# Patient Record
Sex: MALE
[De-identification: names, ages, dates, MRNs, and addresses within clinical notes are randomized per-mention and may not be internally consistent; named-entity substitution may affect disease eponyms.]

---

## 2018-02-23 ENCOUNTER — RESULT REVIEW (OUTPATIENT)
Age: 48
End: 2018-02-23

## 2018-02-23 ENCOUNTER — RX RENEWAL (OUTPATIENT)
Age: 48
End: 2018-02-23

## 2018-02-24 ENCOUNTER — APPOINTMENT (OUTPATIENT)
Dept: FAMILY MEDICINE | Facility: CLINIC | Age: 48
End: 2018-02-24
Payer: COMMERCIAL

## 2018-02-24 VITALS
BODY MASS INDEX: 27.5 KG/M2 | DIASTOLIC BLOOD PRESSURE: 100 MMHG | WEIGHT: 203 LBS | HEART RATE: 71 BPM | SYSTOLIC BLOOD PRESSURE: 150 MMHG | HEIGHT: 72 IN | OXYGEN SATURATION: 97 % | RESPIRATION RATE: 12 BRPM

## 2018-02-24 VITALS — DIASTOLIC BLOOD PRESSURE: 96 MMHG | SYSTOLIC BLOOD PRESSURE: 144 MMHG

## 2018-02-24 DIAGNOSIS — Z78.9 OTHER SPECIFIED HEALTH STATUS: ICD-10-CM

## 2018-02-24 PROCEDURE — 99213 OFFICE O/P EST LOW 20 MIN: CPT

## 2018-05-19 ENCOUNTER — APPOINTMENT (OUTPATIENT)
Dept: FAMILY MEDICINE | Facility: CLINIC | Age: 48
End: 2018-05-19

## 2018-11-01 ENCOUNTER — APPOINTMENT (OUTPATIENT)
Dept: FAMILY MEDICINE | Facility: CLINIC | Age: 48
End: 2018-11-01
Payer: COMMERCIAL

## 2018-11-01 VITALS
WEIGHT: 215 LBS | BODY MASS INDEX: 29.16 KG/M2 | HEART RATE: 89 BPM | OXYGEN SATURATION: 97 % | SYSTOLIC BLOOD PRESSURE: 116 MMHG | DIASTOLIC BLOOD PRESSURE: 90 MMHG | RESPIRATION RATE: 12 BRPM

## 2018-11-01 VITALS — DIASTOLIC BLOOD PRESSURE: 82 MMHG | SYSTOLIC BLOOD PRESSURE: 120 MMHG

## 2018-11-01 PROCEDURE — 99213 OFFICE O/P EST LOW 20 MIN: CPT

## 2018-11-01 RX ORDER — DOXYCYCLINE HYCLATE 100 MG/1
100 TABLET ORAL
Qty: 50 | Refills: 0 | Status: DISCONTINUED | COMMUNITY
Start: 2018-07-30 | End: 2018-11-01

## 2018-11-01 RX ORDER — METRONIDAZOLE 7.5 MG/G
0.75 CREAM TOPICAL
Qty: 45 | Refills: 0 | Status: DISCONTINUED | COMMUNITY
Start: 2018-07-30 | End: 2018-11-01

## 2018-11-01 NOTE — HISTORY OF PRESENT ILLNESS
[FreeTextEntry1] : Pt is here for a med check.  [de-identified] : Here for management of HTN - had a flu shot at work

## 2018-11-01 NOTE — PHYSICAL EXAM
[No Acute Distress] : no acute distress [Well Nourished] : well nourished [Well Developed] : well developed [Well-Appearing] : well-appearing [No JVD] : no jugular venous distention [Supple] : supple [No Lymphadenopathy] : no lymphadenopathy [No Respiratory Distress] : no respiratory distress  [Clear to Auscultation] : lungs were clear to auscultation bilaterally [Normal Rate] : normal rate  [Regular Rhythm] : with a regular rhythm [Normal S1, S2] : normal S1 and S2 [No Murmur] : no murmur heard [No Carotid Bruits] : no carotid bruits [Normal Posterior Cervical Nodes] : no posterior cervical lymphadenopathy [Normal Anterior Cervical Nodes] : no anterior cervical lymphadenopathy

## 2018-11-01 NOTE — ASSESSMENT
[FreeTextEntry1] : 1. HTN- BP was well controlled on norvasc 5 mg 1/day- diet/exercise reviewed.  Will be scheduling colonoscopy.

## 2018-12-20 ENCOUNTER — APPOINTMENT (OUTPATIENT)
Dept: FAMILY MEDICINE | Facility: CLINIC | Age: 48
End: 2018-12-20
Payer: COMMERCIAL

## 2018-12-20 VITALS
SYSTOLIC BLOOD PRESSURE: 126 MMHG | RESPIRATION RATE: 12 BRPM | OXYGEN SATURATION: 98 % | HEART RATE: 81 BPM | DIASTOLIC BLOOD PRESSURE: 70 MMHG | BODY MASS INDEX: 28.44 KG/M2 | WEIGHT: 210 LBS | HEIGHT: 72 IN

## 2018-12-20 PROCEDURE — 99213 OFFICE O/P EST LOW 20 MIN: CPT

## 2018-12-20 NOTE — ASSESSMENT
[FreeTextEntry1] : HTN- well controlled- 126/70- on norvasc 5 mg a day. Diet/exercise reviewed.  Hit 49 y/o - referral for colonoscopy made.  No refill needed.

## 2018-12-20 NOTE — HISTORY OF PRESENT ILLNESS
[FreeTextEntry1] : Patient present for blood pressure check [de-identified] : Here for management of HTN - feeling well on treatment.

## 2019-03-22 ENCOUNTER — RX RENEWAL (OUTPATIENT)
Age: 49
End: 2019-03-22

## 2019-04-24 ENCOUNTER — MEDICATION RENEWAL (OUTPATIENT)
Age: 49
End: 2019-04-24

## 2019-09-24 ENCOUNTER — APPOINTMENT (OUTPATIENT)
Dept: FAMILY MEDICINE | Facility: CLINIC | Age: 49
End: 2019-09-24
Payer: COMMERCIAL

## 2019-09-24 ENCOUNTER — NON-APPOINTMENT (OUTPATIENT)
Age: 49
End: 2019-09-24

## 2019-09-24 VITALS
DIASTOLIC BLOOD PRESSURE: 80 MMHG | OXYGEN SATURATION: 98 % | HEIGHT: 72 IN | SYSTOLIC BLOOD PRESSURE: 130 MMHG | WEIGHT: 208 LBS | RESPIRATION RATE: 14 BRPM | HEART RATE: 87 BPM | BODY MASS INDEX: 28.17 KG/M2

## 2019-09-24 DIAGNOSIS — Z00.00 ENCOUNTER FOR GENERAL ADULT MEDICAL EXAMINATION W/OUT ABNORMAL FINDINGS: ICD-10-CM

## 2019-09-24 DIAGNOSIS — M54.5 LOW BACK PAIN: ICD-10-CM

## 2019-09-24 LAB
BILIRUB UR QL STRIP: NEGATIVE
GLUCOSE UR-MCNC: NEGATIVE
HCG UR QL: 0.2 EU/DL
HGB UR QL STRIP.AUTO: NEGATIVE
KETONES UR-MCNC: NEGATIVE
LEUKOCYTE ESTERASE UR QL STRIP: NEGATIVE
NITRITE UR QL STRIP: NEGATIVE
PH UR STRIP: 5.5
PROT UR STRIP-MCNC: NEGATIVE
SP GR UR STRIP: 1.02

## 2019-09-24 PROCEDURE — 93000 ELECTROCARDIOGRAM COMPLETE: CPT

## 2019-09-24 PROCEDURE — 81003 URINALYSIS AUTO W/O SCOPE: CPT | Mod: QW

## 2019-09-24 PROCEDURE — 99396 PREV VISIT EST AGE 40-64: CPT | Mod: 25

## 2019-09-24 PROCEDURE — 90674 CCIIV4 VAC NO PRSV 0.5 ML IM: CPT

## 2019-09-24 PROCEDURE — G0008: CPT

## 2019-09-24 NOTE — HEALTH RISK ASSESSMENT
[Very Good] : ~his/her~  mood as very good [Monthly or less (1 pt)] : Monthly or less (1 point) [Yes] : Yes [1 or 2 (0 pts)] : 1 or 2 (0 points) [Never (0 pts)] : Never (0 points) [No] : In the past 12 months have you used drugs other than those required for medical reasons? No [No falls in past year] : Patient reported no falls in the past year [0] : 2) Feeling down, depressed, or hopeless: Not at all (0) [None] : None [With Significant Other] : lives with significant other [With Family] : lives with family [Employed] : employed [College] : College [] :  [Sexually Active] : sexually active [# Of Children ___] : has [unfilled] children [Feels Safe at Home] : Feels safe at home [Fully functional (bathing, dressing, toileting, transferring, walking, feeding)] : Fully functional (bathing, dressing, toileting, transferring, walking, feeding) [Fully functional (using the telephone, shopping, preparing meals, housekeeping, doing laundry, using] : Fully functional and needs no help or supervision to perform IADLs (using the telephone, shopping, preparing meals, housekeeping, doing laundry, using transportation, managing medications and managing finances) [Reports normal functional visual acuity (ie: able to read med bottle)] : Reports normal functional visual acuity [Smoke Detector] : smoke detector [Carbon Monoxide Detector] : carbon monoxide detector [Safety elements used in home] : safety elements used in home [Seat Belt] :  uses seat belt [Sunscreen] : uses sunscreen [Patient/Caregiver unclear of wishes] : Patient/Caregiver unclear of wishes [] : No [Audit-CScore] : 1 [de-identified] : regular exercise [de-identified] : no special diet [[id number]] : 0 [Change in mental status noted] : No change in mental status noted [Language] : denies difficulty with language [Behavior] : denies difficulty with behavior [Learning/Retaining New Information] : denies difficulty learning/retaining new information [Handling Complex Tasks] : denies difficulty handling complex tasks [Reasoning] : denies difficulty with reasoning [Spatial Ability and Orientation] : denies difficulty with spatial ability and orientation [Reports changes in hearing] : Reports no changes in hearing [Reports changes in vision] : Reports no changes in vision [Reports changes in dental health] : Reports no changes in dental health [Guns at Home] : no guns at home [Travel to Developing Areas] : does not  travel to developing areas [TB Exposure] : is not being exposed to tuberculosis [ColonoscopyDate] : not yet [HIVComments] : negative [HepatitisCComments] : negative [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

## 2019-09-24 NOTE — PHYSICAL EXAM
[No Carotid Bruits] : no carotid bruits [No Edema] : there was no peripheral edema [Soft] : abdomen soft [Normal Bowel Sounds] : normal bowel sounds [Non Tender] : non-tender [Normal Posterior Cervical Nodes] : no posterior cervical lymphadenopathy [Normal Anterior Cervical Nodes] : no anterior cervical lymphadenopathy [Normal] : affect was normal and insight and judgment were intact [de-identified] : mild tenderness in lumbar spine [de-identified] : no suspicious lesions noted on exam

## 2019-11-13 ENCOUNTER — RX RENEWAL (OUTPATIENT)
Age: 49
End: 2019-11-13

## 2019-12-10 ENCOUNTER — RX RENEWAL (OUTPATIENT)
Age: 49
End: 2019-12-10

## 2019-12-28 ENCOUNTER — RX RENEWAL (OUTPATIENT)
Age: 49
End: 2019-12-28

## 2020-01-16 ENCOUNTER — APPOINTMENT (OUTPATIENT)
Dept: FAMILY MEDICINE | Facility: CLINIC | Age: 50
End: 2020-01-16
Payer: COMMERCIAL

## 2020-01-16 VITALS
HEART RATE: 79 BPM | HEIGHT: 72 IN | OXYGEN SATURATION: 98 % | DIASTOLIC BLOOD PRESSURE: 72 MMHG | SYSTOLIC BLOOD PRESSURE: 126 MMHG | BODY MASS INDEX: 27.36 KG/M2 | RESPIRATION RATE: 14 BRPM | WEIGHT: 202 LBS

## 2020-01-16 DIAGNOSIS — Z12.11 ENCOUNTER FOR SCREENING FOR MALIGNANT NEOPLASM OF COLON: ICD-10-CM

## 2020-01-16 PROCEDURE — 99213 OFFICE O/P EST LOW 20 MIN: CPT

## 2020-01-16 NOTE — ASSESSMENT
[FreeTextEntry1] : Pt strongly advised to do colonoscopy - referral given, pt agreeable to complete\par Blood pressure, stable, continue current medication regimen\par DASH diet \par

## 2020-01-16 NOTE — HISTORY OF PRESENT ILLNESS
[FreeTextEntry1] : pt presents for blood pressure check  [de-identified] : Patient reports today fro follow up. Patient reports feeling well, no acute complaints.

## 2020-08-15 ENCOUNTER — APPOINTMENT (OUTPATIENT)
Dept: FAMILY MEDICINE | Facility: CLINIC | Age: 50
End: 2020-08-15
Payer: COMMERCIAL

## 2020-08-15 ENCOUNTER — APPOINTMENT (OUTPATIENT)
Dept: FAMILY MEDICINE | Facility: CLINIC | Age: 50
End: 2020-08-15

## 2020-08-15 VITALS
TEMPERATURE: 98 F | WEIGHT: 212 LBS | RESPIRATION RATE: 14 BRPM | HEART RATE: 81 BPM | OXYGEN SATURATION: 98 % | HEIGHT: 72 IN | DIASTOLIC BLOOD PRESSURE: 80 MMHG | BODY MASS INDEX: 28.71 KG/M2 | SYSTOLIC BLOOD PRESSURE: 120 MMHG

## 2020-08-15 VITALS — DIASTOLIC BLOOD PRESSURE: 90 MMHG | SYSTOLIC BLOOD PRESSURE: 140 MMHG

## 2020-08-15 DIAGNOSIS — R51 HEADACHE: ICD-10-CM

## 2020-08-15 DIAGNOSIS — R42 DIZZINESS AND GIDDINESS: ICD-10-CM

## 2020-08-15 PROCEDURE — 99214 OFFICE O/P EST MOD 30 MIN: CPT

## 2020-08-15 RX ORDER — IBUPROFEN 800 MG/1
800 TABLET, FILM COATED ORAL 3 TIMES DAILY
Qty: 90 | Refills: 1 | Status: DISCONTINUED | COMMUNITY
Start: 2019-09-24 | End: 2020-08-15

## 2020-08-15 NOTE — HISTORY OF PRESENT ILLNESS
[FreeTextEntry8] : pt presents for blood pressure check \par and c/o vertigo sx x 2 days \par he said he woke up in the morning and he felt the room spin\par he said if he gives himself time it usually resolves\par he had a headache in the back of his head at the time\par he said he feels 97% back to normal but not completely\par denies syncope\par \par he said he has had this in the past and he felt the same way\par \par he said it usually lasts 3 days to 7 days and then resolves\par \par he said if he is lying in bed and turns over quick he feels the room spinning \par denies dizziness with head movements\par \par c/o headache dull  pain,   1-2 /10 intermittent but resolved , alleviated by excedrin or tylenol  , aggravated by   vertigo as it comes on after vertigo occurs\par this was  a new headache and not like headaches he has had in the past \par \par he never saw an ent\par \par he can still do his normal activities\par denies fevers or chills, nausea or vomiting \par \par \par \par

## 2020-08-15 NOTE — REVIEW OF SYSTEMS
[Headache] : headache [Dizziness] : dizziness [Negative] : Respiratory [Fever] : no fever [Earache] : no earache [Vision Problems] : no vision problems [Chills] : no chills [Hearing Loss] : no hearing loss [Unsteady Walk] : no ataxia [Fainting] : no fainting [Confusion] : no confusion [FreeTextEntry3] : denies vision changes with headaches  [Memory Loss] : no memory loss

## 2020-08-15 NOTE — PHYSICAL EXAM
[No Lymphadenopathy] : no lymphadenopathy [Supple] : supple [Normal] : normal rate, regular rhythm, normal S1 and S2 and no murmur heard [No Edema] : there was no peripheral edema [No Focal Deficits] : no focal deficits [Normal Affect] : the affect was normal [Normal Mood] : the mood was normal [de-identified] : CN 2-12 INTACT, NORMAL STRENGTH UPPER AND LOWER EXT B/L 5/5, NORMAL RAPID ALTERNATING MOVEMENTS AND FINGER TO NOSE [Normal Insight/Judgement] : insight and judgment were intact [de-identified] : no nystagmus b/l eyes

## 2020-08-15 NOTE — PLAN
[FreeTextEntry1] : \par \par new headache and vertigo\par ent consult\par mri/mra if neg will send to neuro for further eval\par labs will go to [facility]\par meclizine trial 12.5mg tab 1-2 tabs bid prn advised no driving after use as might make him drowsy\par \par HTN slightly uncontrolled\par repeat 2 weeks\par avoid salt\par \par f/u 2 weeks for bp check pt agreed w/plan

## 2020-08-29 ENCOUNTER — APPOINTMENT (OUTPATIENT)
Dept: FAMILY MEDICINE | Facility: CLINIC | Age: 50
End: 2020-08-29

## 2020-09-03 ENCOUNTER — APPOINTMENT (OUTPATIENT)
Dept: FAMILY MEDICINE | Facility: CLINIC | Age: 50
End: 2020-09-03

## 2020-09-23 ENCOUNTER — RX RENEWAL (OUTPATIENT)
Age: 50
End: 2020-09-23

## 2020-11-23 ENCOUNTER — NON-APPOINTMENT (OUTPATIENT)
Age: 50
End: 2020-11-23

## 2021-06-21 ENCOUNTER — TRANSCRIPTION ENCOUNTER (OUTPATIENT)
Age: 51
End: 2021-06-21

## 2021-06-22 ENCOUNTER — TRANSCRIPTION ENCOUNTER (OUTPATIENT)
Age: 51
End: 2021-06-22

## 2021-09-11 ENCOUNTER — RX RENEWAL (OUTPATIENT)
Age: 51
End: 2021-09-11

## 2021-09-17 ENCOUNTER — APPOINTMENT (OUTPATIENT)
Dept: FAMILY MEDICINE | Facility: CLINIC | Age: 51
End: 2021-09-17
Payer: COMMERCIAL

## 2021-09-17 VITALS
SYSTOLIC BLOOD PRESSURE: 136 MMHG | OXYGEN SATURATION: 96 % | BODY MASS INDEX: 28.71 KG/M2 | DIASTOLIC BLOOD PRESSURE: 90 MMHG | WEIGHT: 212 LBS | TEMPERATURE: 97.8 F | HEIGHT: 72 IN | HEART RATE: 79 BPM | RESPIRATION RATE: 14 BRPM

## 2021-09-17 PROCEDURE — 99213 OFFICE O/P EST LOW 20 MIN: CPT

## 2021-09-17 RX ORDER — MECLIZINE HYDROCHLORIDE 12.5 MG/1
12.5 TABLET ORAL
Qty: 40 | Refills: 0 | Status: DISCONTINUED | COMMUNITY
Start: 2020-08-15 | End: 2021-09-17

## 2021-09-17 NOTE — PHYSICAL EXAM
[No Acute Distress] : no acute distress [Normal Sclera/Conjunctiva] : normal sclera/conjunctiva [No JVD] : no jugular venous distention [No Respiratory Distress] : no respiratory distress  [No Accessory Muscle Use] : no accessory muscle use [Clear to Auscultation] : lungs were clear to auscultation bilaterally [Normal Rate] : normal rate  [Regular Rhythm] : with a regular rhythm [Normal S1, S2] : normal S1 and S2 [No Murmur] : no murmur heard [No Extremity Clubbing/Cyanosis] : no extremity clubbing/cyanosis [Kyphosis] : no kyphosis [Normal Gait] : normal gait [Normal Affect] : the affect was normal [Normal Insight/Judgement] : insight and judgment were intact

## 2021-09-17 NOTE — HISTORY OF PRESENT ILLNESS
[FreeTextEntry1] : patient presents for medication renewal\par  [de-identified] : 51 yr old male is here today for follow up. States vertigo symptoms have resolved. Denies changes in vision, dizziness, chest pain, palpitations and lower extremity edema.\par

## 2021-09-17 NOTE — PLAN
[FreeTextEntry1] : 51 yr old male medication refills \par \par continue all medications as directed \par healthy diet and physical activity as tolerated\par BP goal <140/90 \par RTO as routine for follow up. \par

## 2021-11-15 ENCOUNTER — RESULT CHARGE (OUTPATIENT)
Age: 51
End: 2021-11-15

## 2021-11-16 ENCOUNTER — APPOINTMENT (OUTPATIENT)
Dept: FAMILY MEDICINE | Facility: CLINIC | Age: 51
End: 2021-11-16
Payer: COMMERCIAL

## 2021-11-16 ENCOUNTER — NON-APPOINTMENT (OUTPATIENT)
Age: 51
End: 2021-11-16

## 2021-11-16 VITALS
BODY MASS INDEX: 28.44 KG/M2 | SYSTOLIC BLOOD PRESSURE: 130 MMHG | WEIGHT: 210 LBS | DIASTOLIC BLOOD PRESSURE: 100 MMHG | TEMPERATURE: 97.6 F | OXYGEN SATURATION: 95 % | RESPIRATION RATE: 16 BRPM | HEIGHT: 72 IN | HEART RATE: 94 BPM

## 2021-11-16 VITALS — TEMPERATURE: 97.3 F

## 2021-11-16 DIAGNOSIS — N52.9 MALE ERECTILE DYSFUNCTION, UNSPECIFIED: ICD-10-CM

## 2021-11-16 DIAGNOSIS — L50.3 DERMATOGRAPHIC URTICARIA: ICD-10-CM

## 2021-11-16 DIAGNOSIS — Z23 ENCOUNTER FOR IMMUNIZATION: ICD-10-CM

## 2021-11-16 PROCEDURE — 99214 OFFICE O/P EST MOD 30 MIN: CPT | Mod: 25

## 2021-11-16 PROCEDURE — G0008: CPT

## 2021-11-16 PROCEDURE — 93000 ELECTROCARDIOGRAM COMPLETE: CPT

## 2021-11-16 PROCEDURE — 90686 IIV4 VACC NO PRSV 0.5 ML IM: CPT

## 2021-11-16 NOTE — PHYSICAL EXAM
[No Lymphadenopathy] : no lymphadenopathy [Supple] : supple [Normal] : normal rate, regular rhythm, normal S1 and S2 and no murmur heard [Pedal Pulses Present] : the pedal pulses are present [No Edema] : there was no peripheral edema [Coordination Grossly Intact] : coordination grossly intact [No Focal Deficits] : no focal deficits [Normal Gait] : normal gait [de-identified] : no calf tenderness bilaterally [de-identified] : +scratch test over back. multiple healing insect bites over left shoulder and left anterior chest.

## 2021-11-16 NOTE — REVIEW OF SYSTEMS
[Itching] : itching [Skin Rash] : skin rash [Impotence] : impotence [Negative] : Neurological [Dysuria] : no dysuria [Incontinence] : no incontinence [Hesitancy] : no hesitancy [Nocturia] : no nocturia [Hematuria] : no hematuria [Frequency] : no frequency [Poor Libido] : libido not poor [Mole Changes] : no mole changes [Nail Changes] : no nail changes [Hair Changes] : no hair changes

## 2021-11-16 NOTE — HISTORY OF PRESENT ILLNESS
[FreeTextEntry1] : Patient presents for a follow up on blood pressure [de-identified] : 50 yo male, pmh of HTN who has been on norvasc 5 for several years who presents for BP follow up. Also has other complaints today. \carlos Says he is concerned for occasional rash on the back. Gets very itchy. Tried lotion when he can reach. Started last year during winter and goes away during summer. He tends to scratch. No new soaps or detergents. No new fabrics. Had some recent flea bites as his dog recently had fleas. But itching was present prior to said bites.  \carlos Also requests refill of ED medications to new pharmacy. \carlos Denies any headaches. blurry vision, dyspnea, chest pain, cough, dizziness, numbness, weakness. \carlos

## 2021-11-16 NOTE — ASSESSMENT
[FreeTextEntry1] : HTN-uncontrolled\par Goal BP <140/90 or <130/80 if comorbidities \par pt is on norvasc 5, declines increase dose of medication. Says that he thinks it is due to anxiety/white coat when in office. \par Advised home BP monitoring and to keep log and advised to bring readings to office next visit. Also advised to bring machine to office to ensure machine calibrated and logs are accurate. If normal at home, may confirm white coat syndrome component. \par checking labs for renal function in chemistry and blood count in cbc \par EKG ordered today showing Sinus, rate 87. RSR v1. No evidence of acute ischemic changes. compared to 2019 and is improved as prior showed RBBB\par Avoid salt\par Cardio consult on monday with [facility]\par given precautions for uncontrolled BP including CVA. Advised to seek higher level of care if with chest pain, dizziness, headaches, blurry vision or generally feeling unwell. \par Patient verbalized understanding of this plan. \par \par Urticaria\par advised moisturizing skin \par avoid new soaps or detergents\par claritin sent for daily use to assess for improvement in histamine reaction\par \par Erectile Dysfunction\par well controlled on current treatment\par refills sent to new pharmacy as requested by patient \par \par Regarding patient's BMI currently in range of:overweight \par - encourage lifestyle modifications\par - diet and exercise\par - reduce calorie intake\par - no soda/ junk food/ snacks\par - consider mixed grains/ whole grains, leafy vegetables, and an appropriate serving of protein\par - lipid and a1c ordered\par \par \par Flu shot given\par F/u 1 month for BP check\par labs to be done at outside lab, will notify patient of results when available and received.\par Patient agrees with plan, all further questions answered during encounter.\par

## 2021-11-17 LAB — HBA1C MFR BLD HPLC: 5.3

## 2021-11-19 ENCOUNTER — APPOINTMENT (OUTPATIENT)
Dept: FAMILY MEDICINE | Facility: CLINIC | Age: 51
End: 2021-11-19

## 2021-12-14 ENCOUNTER — APPOINTMENT (OUTPATIENT)
Dept: CT IMAGING | Facility: CLINIC | Age: 51
End: 2021-12-14
Payer: COMMERCIAL

## 2021-12-14 ENCOUNTER — OUTPATIENT (OUTPATIENT)
Dept: OUTPATIENT SERVICES | Facility: HOSPITAL | Age: 51
LOS: 1 days | End: 2021-12-14
Payer: COMMERCIAL

## 2021-12-14 DIAGNOSIS — R06.09 OTHER FORMS OF DYSPNEA: ICD-10-CM

## 2021-12-14 PROCEDURE — 75574 CT ANGIO HRT W/3D IMAGE: CPT

## 2021-12-14 PROCEDURE — 75574 CT ANGIO HRT W/3D IMAGE: CPT | Mod: 26

## 2021-12-22 ENCOUNTER — APPOINTMENT (OUTPATIENT)
Dept: FAMILY MEDICINE | Facility: CLINIC | Age: 51
End: 2021-12-22
Payer: COMMERCIAL

## 2021-12-22 VITALS
RESPIRATION RATE: 14 BRPM | HEIGHT: 72 IN | WEIGHT: 210 LBS | BODY MASS INDEX: 28.44 KG/M2 | TEMPERATURE: 97.8 F | OXYGEN SATURATION: 97 % | HEART RATE: 83 BPM | DIASTOLIC BLOOD PRESSURE: 90 MMHG | SYSTOLIC BLOOD PRESSURE: 152 MMHG

## 2021-12-22 VITALS — SYSTOLIC BLOOD PRESSURE: 142 MMHG | DIASTOLIC BLOOD PRESSURE: 100 MMHG

## 2021-12-22 DIAGNOSIS — I10 ESSENTIAL (PRIMARY) HYPERTENSION: ICD-10-CM

## 2021-12-22 PROCEDURE — 99214 OFFICE O/P EST MOD 30 MIN: CPT

## 2021-12-22 NOTE — PLAN
[FreeTextEntry1] : HTN-uncontrolled\par Goal BP <140/90 \par increasing norvasc now to 10mg daily. \par counseled on side effects, call me if worsening leg swelling noted. \par nov 2021 labs for renal function, blood count, lipids all normal range. \par Avoid salt\par Says he recently saw [facility], had coronary cta done, was noted wnl per patient, will request records. \par Advised to purchase home BP monitor and to keep log and advised to bring readings to office next visit\par given precautions for uncontrolled BP including CVA. Advised to seek higher level of care if with chest pain, dizziness, headaches, blurry vision or generally feeling unwell. \par Patient verbalized understanding of this plan. \par \par f/u 1 month for bp\par Patient agrees with plan, all further questions answered during encounter.\par \par \par

## 2021-12-22 NOTE — HISTORY OF PRESENT ILLNESS
[FreeTextEntry1] : patient presents for blood pressure check  [de-identified] : 50 yo male, hx of htn, who presents today for follow up of htn. \par Has been monitoring BP at work, does not have bp cuff at home, runs 130s/90-100s at work. \par No headache, blurry vision. \par No chest pain or dyspnea. \par He has been having no other symptoms. \par No side effects to taking norvasc thus far. \par No other complaints today.

## 2021-12-22 NOTE — PHYSICAL EXAM
[No Lymphadenopathy] : no lymphadenopathy [Supple] : supple [Normal] : normal rate, regular rhythm, normal S1 and S2 and no murmur heard [No Edema] : there was no peripheral edema [Coordination Grossly Intact] : coordination grossly intact [No Focal Deficits] : no focal deficits [Normal Gait] : normal gait

## 2022-02-07 ENCOUNTER — RX RENEWAL (OUTPATIENT)
Age: 52
End: 2022-02-07

## 2022-06-06 ENCOUNTER — APPOINTMENT (OUTPATIENT)
Dept: FAMILY MEDICINE | Facility: CLINIC | Age: 52
End: 2022-06-06
Payer: COMMERCIAL

## 2022-06-06 VITALS
HEART RATE: 75 BPM | HEIGHT: 72 IN | RESPIRATION RATE: 15 BRPM | TEMPERATURE: 97.3 F | DIASTOLIC BLOOD PRESSURE: 90 MMHG | WEIGHT: 202 LBS | SYSTOLIC BLOOD PRESSURE: 124 MMHG | OXYGEN SATURATION: 97 % | BODY MASS INDEX: 27.36 KG/M2

## 2022-06-06 DIAGNOSIS — R53.82 CHRONIC FATIGUE, UNSPECIFIED: ICD-10-CM

## 2022-06-06 DIAGNOSIS — E66.3 OVERWEIGHT: ICD-10-CM

## 2022-06-06 DIAGNOSIS — Z00.01 ENCOUNTER FOR GENERAL ADULT MEDICAL EXAMINATION WITH ABNORMAL FINDINGS: ICD-10-CM

## 2022-06-06 DIAGNOSIS — R68.82 DECREASED LIBIDO: ICD-10-CM

## 2022-06-06 LAB
BILIRUB UR QL STRIP: NEGATIVE
GLUCOSE UR-MCNC: NEGATIVE
HCG UR QL: 0.2 EU/DL
HGB UR QL STRIP.AUTO: NEGATIVE
KETONES UR-MCNC: NEGATIVE
LEUKOCYTE ESTERASE UR QL STRIP: NEGATIVE
NITRITE UR QL STRIP: NEGATIVE
PH UR STRIP: 6
PROT UR STRIP-MCNC: NEGATIVE
SP GR UR STRIP: 1.02

## 2022-06-06 PROCEDURE — 99396 PREV VISIT EST AGE 40-64: CPT | Mod: 25

## 2022-06-06 PROCEDURE — 36415 COLL VENOUS BLD VENIPUNCTURE: CPT

## 2022-06-06 PROCEDURE — 99214 OFFICE O/P EST MOD 30 MIN: CPT | Mod: 25

## 2022-06-06 PROCEDURE — 81003 URINALYSIS AUTO W/O SCOPE: CPT | Mod: QW

## 2022-06-06 RX ORDER — AZITHROMYCIN 250 MG/1
250 TABLET, FILM COATED ORAL
Qty: 6 | Refills: 0 | Status: DISCONTINUED | COMMUNITY
Start: 2022-06-02

## 2022-06-06 RX ORDER — DOXYCYCLINE 100 MG/1
100 CAPSULE ORAL
Qty: 20 | Refills: 0 | Status: DISCONTINUED | COMMUNITY
Start: 2022-05-14

## 2022-06-06 RX ORDER — CEFDINIR 300 MG/1
300 CAPSULE ORAL
Qty: 14 | Refills: 0 | Status: DISCONTINUED | COMMUNITY
Start: 2022-03-07

## 2022-06-06 NOTE — ASSESSMENT
[FreeTextEntry1] : CPE\par -Labs\par -Offered HIV/ STD/ Hep C Screening declined \par -Colonoscopy Screening will be done at [facility] program that patient follows with \par -PSA\par -Advised annual ophthalmology, dental and dermatology visits\par -Annual depression screening - negative    \par - flu vaccine utd from last year, declined tdap as pt thinks he got it last month, will provide vaccine records to me. \par - pt counseled to obtain shingles vaccine, will check with insurance regarding coverage \par -urine dip  pending\par \par HTN- stable\par -Continue current regimen of norvasc \par -Continue regular bp checks at home and advised to bring machine to office with readings next visit\par -Avoid salt\par -encouraged diet and exercise\par \par Decreased Sex Drive with Chronic fatigue \par pt on sildenafil for ED.\par pt now with new undiagnosed problem w/uncertain etiology for increased fatigue with decreased sex drive\par checking tsh, t4, vitamin D, testosterone levels to eval for etiology of problem\par advised we may need additional work up and/or referrals based on findings of initial lab studies. \par \par f/u 1 year cpe, 6 months for htn\par labs drawn in office and patient will be notified of results when available\par Patient agrees with plan, all further questions answered during encounter.\par \par \par

## 2022-06-06 NOTE — PHYSICAL EXAM
[No Lymphadenopathy] : no lymphadenopathy [Supple] : supple [Thyroid Normal, No Nodules] : the thyroid was normal and there were no nodules present [Pedal Pulses Present] : the pedal pulses are present [No Edema] : there was no peripheral edema [Soft] : abdomen soft [Non Tender] : non-tender [Non-distended] : non-distended [Normal Bowel Sounds] : normal bowel sounds [Coordination Grossly Intact] : coordination grossly intact [No Focal Deficits] : no focal deficits [Normal Gait] : normal gait [2+] : left 2+ [Normal] : affect was normal and insight and judgment were intact [Dysdiadochokinesia Bilaterally] : not present [de-identified] : no calf tenderness bilaterally, right lower extremity with varicose veins  [de-identified] : multiple benign appearing nevi

## 2022-06-06 NOTE — HEALTH RISK ASSESSMENT
[Very Good] : ~his/her~  mood as very good [Never] : Never [Yes] : Yes [Monthly or less (1 pt)] : Monthly or less (1 point) [1 or 2 (0 pts)] : 1 or 2 (0 points) [Never (0 pts)] : Never (0 points) [No] : In the past 12 months have you used drugs other than those required for medical reasons? No [No falls in past year] : Patient reported no falls in the past year [0] : 2) Feeling down, depressed, or hopeless: Not at all (0) [No Retinopathy] : No retinopathy [Patient declined bone density test] : Patient declined bone density test [Patient declined colonoscopy] : Patient declined colonoscopy [None] : None [With Family] : lives with family [Employed] : employed [College] : College [] :  [Feels Safe at Home] : Feels safe at home [Fully functional (bathing, dressing, toileting, transferring, walking, feeding)] : Fully functional (bathing, dressing, toileting, transferring, walking, feeding) [Fully functional (using the telephone, shopping, preparing meals, housekeeping, doing laundry, using] : Fully functional and needs no help or supervision to perform IADLs (using the telephone, shopping, preparing meals, housekeeping, doing laundry, using transportation, managing medications and managing finances) [Smoke Detector] : smoke detector [Carbon Monoxide Detector] : carbon monoxide detector [Guns at Home] : guns at home [Seat Belt] :  uses seat belt [Intercurrent Urgi Care visits] : went to urgent care [PHQ-2 Negative - No further assessment needed] : PHQ-2 Negative - No further assessment needed [HIV test declined] : HIV test declined [Hepatitis C test declined] : Hepatitis C test declined [Sexually Active] : sexually active [With Patient/Caregiver] : , with patient/caregiver [de-identified] : UC visit last week for uri.  [de-identified] : cardio  [Audit-CScore] : 1 [de-identified] : working out 2-3 times a week.  [de-identified] : regular [IAT2Euazn] : 0 [Change in mental status noted] : No change in mental status noted [Language] : denies difficulty with language [Behavior] : denies difficulty with behavior [Learning/Retaining New Information] : denies difficulty learning/retaining new information [Handling Complex Tasks] : denies difficulty handling complex tasks [Reasoning] : denies difficulty with reasoning [Spatial Ability and Orientation] : denies difficulty with spatial ability and orientation [High Risk Behavior] : no high risk behavior [Reports changes in hearing] : Reports no changes in hearing [Reports changes in vision] : Reports no changes in vision [Reports changes in dental health] : Reports no changes in dental health [Travel to Developing Areas] : does not  travel to developing areas [ColonoscopyComments] : will be having it done at [facility] next month.  [FreeTextEntry2] : works for [employer]  [de-identified] : w [AdvancecareDate] : 6/6/2022 [FreeTextEntry4] : is fine with his spouse making decisions.

## 2022-06-06 NOTE — REVIEW OF SYSTEMS
[Cough] : cough [Impotence] : impotence [Poor Libido] : poor libido [Negative] : Heme/Lymph [Shortness Of Breath] : no shortness of breath [Wheezing] : no wheezing [Dyspnea on Exertion] : not dyspnea on exertion [Dysuria] : no dysuria [Incontinence] : no incontinence [Nocturia] : no nocturia [Hesitancy] : no hesitancy [Hematuria] : no hematuria [Frequency] : no frequency [FreeTextEntry6] : recently diagnosed uri that he is recovering from, cough improving

## 2022-06-06 NOTE — HISTORY OF PRESENT ILLNESS
[FreeTextEntry1] : Patient presents for physical exam and fasting labs  [de-identified] : 53 yo male, hx of htn who presents today for cpe. \par \par Today has a concern for feeling more fatigued. \par He does not take any vitamins. \par Has a lack of sex drive. uses sildenafil for ED but still feels that lack of desire to have sex. \par has been present for a few months. \par no temperature intolerance\par no weight changes. \par no hair loss. \par

## 2022-06-07 DIAGNOSIS — R79.89 OTHER SPECIFIED ABNORMAL FINDINGS OF BLOOD CHEMISTRY: ICD-10-CM

## 2022-06-07 LAB
25(OH)D3 SERPL-MCNC: 29.1 NG/ML
ALBUMIN SERPL ELPH-MCNC: 5.2 G/DL
ALP BLD-CCNC: 63 U/L
ALT SERPL-CCNC: 14 U/L
ANION GAP SERPL CALC-SCNC: 11 MMOL/L
AST SERPL-CCNC: 15 U/L
BASOPHILS # BLD AUTO: 0.05 K/UL
BASOPHILS NFR BLD AUTO: 0.6 %
BILIRUB SERPL-MCNC: 0.7 MG/DL
BUN SERPL-MCNC: 18 MG/DL
CALCIUM SERPL-MCNC: 10.1 MG/DL
CHLORIDE SERPL-SCNC: 102 MMOL/L
CHOLEST SERPL-MCNC: 135 MG/DL
CO2 SERPL-SCNC: 28 MMOL/L
CREAT SERPL-MCNC: 0.91 MG/DL
EGFR: 100 ML/MIN/1.73M2
EOSINOPHIL # BLD AUTO: 0.25 K/UL
EOSINOPHIL NFR BLD AUTO: 2.9 %
GLUCOSE SERPL-MCNC: 93 MG/DL
HCT VFR BLD CALC: 46.8 %
HDLC SERPL-MCNC: 52 MG/DL
HGB BLD-MCNC: 15.8 G/DL
IMM GRANULOCYTES NFR BLD AUTO: 0.2 %
LDLC SERPL CALC-MCNC: 63 MG/DL
LYMPHOCYTES # BLD AUTO: 1.85 K/UL
LYMPHOCYTES NFR BLD AUTO: 21.4 %
MAN DIFF?: NORMAL
MCHC RBC-ENTMCNC: 28.6 PG
MCHC RBC-ENTMCNC: 33.8 GM/DL
MCV RBC AUTO: 84.8 FL
MONOCYTES # BLD AUTO: 0.67 K/UL
MONOCYTES NFR BLD AUTO: 7.8 %
NEUTROPHILS # BLD AUTO: 5.8 K/UL
NEUTROPHILS NFR BLD AUTO: 67.1 %
NONHDLC SERPL-MCNC: 83 MG/DL
PLATELET # BLD AUTO: 333 K/UL
POTASSIUM SERPL-SCNC: 4.2 MMOL/L
PROT SERPL-MCNC: 7.4 G/DL
PSA SERPL-MCNC: 1.13 NG/ML
RBC # BLD: 5.52 M/UL
RBC # FLD: 12.9 %
SODIUM SERPL-SCNC: 140 MMOL/L
T4 FREE SERPL-MCNC: 1.3 NG/DL
TESTOST FREE SERPL-MCNC: 6.9 PG/ML
TESTOST SERPL-MCNC: 186 NG/DL
TRIGL SERPL-MCNC: 99 MG/DL
TSH SERPL-ACNC: 0.64 UIU/ML
WBC # FLD AUTO: 8.64 K/UL

## 2022-06-27 ENCOUNTER — RX RENEWAL (OUTPATIENT)
Age: 52
End: 2022-06-27

## 2022-07-11 ENCOUNTER — RX RENEWAL (OUTPATIENT)
Age: 52
End: 2022-07-11

## 2022-08-09 ENCOUNTER — APPOINTMENT (OUTPATIENT)
Dept: FAMILY MEDICINE | Facility: CLINIC | Age: 52
End: 2022-08-09

## 2022-08-09 VITALS
TEMPERATURE: 98 F | RESPIRATION RATE: 14 BRPM | WEIGHT: 213 LBS | OXYGEN SATURATION: 97 % | BODY MASS INDEX: 28.85 KG/M2 | DIASTOLIC BLOOD PRESSURE: 92 MMHG | HEIGHT: 72 IN | HEART RATE: 82 BPM | SYSTOLIC BLOOD PRESSURE: 118 MMHG

## 2022-08-09 DIAGNOSIS — T88.7XXA UNSPECIFIED ADVERSE EFFECT OF DRUG OR MEDICAMENT, INITIAL ENCOUNTER: ICD-10-CM

## 2022-08-09 PROCEDURE — 99214 OFFICE O/P EST MOD 30 MIN: CPT

## 2022-08-09 NOTE — PHYSICAL EXAM
[Normal] : no respiratory distress, lungs were clear to auscultation bilaterally and no accessory muscle use [Normal Rate] : normal rate  [Regular Rhythm] : with a regular rhythm [Normal S1, S2] : normal S1 and S2 [Pedal Pulses Present] : the pedal pulses are present [No Edema] : there was no peripheral edema [Soft] : abdomen soft [Non Tender] : non-tender [Normal Bowel Sounds] : normal bowel sounds [Coordination Grossly Intact] : coordination grossly intact [No Focal Deficits] : no focal deficits [Normal Gait] : normal gait [de-identified] : no calf tenderness bilaterally

## 2022-08-09 NOTE — HISTORY OF PRESENT ILLNESS
[FreeTextEntry1] : Patient presents for a follow up on blood pressure also has noticed swelling in both feet and ankles on and off for a few weeks  [de-identified] : 53 yo male, hx of HTN who presents today for follow up of BP, is concerned for increased swelling in ankles and feet for a few weeks. \carlos Says he has been on norvasc for years. \par Swelling not painful. \par Often noted at the end of the day. \par Not present at all times. \par Says he has been having some increase in salted foods as well recently. \carlos Is traveling to [facility] for a few weeks soon. \par Has no other complaints.

## 2022-08-09 NOTE — ASSESSMENT
[FreeTextEntry1] : HTN\par - complicated by medication side effect of leg swelling that is mild, currently not present at time of exam. \par - discussed potentially switching medication due to norvasc side effect of lower extremity edema but patient says he would like to monitor symptoms and try to cut salt and if still bothersome, will come back to discuss med change. but for now will continue current regimen of norvasc 10 as it is achieving good bp control at this time. \par -advised he use compression socks and elevate feet\par -Continue regular bp checks at home and advised to bring machine to office with readings next visit\par -encouraged diet and exercise\par -Labs were reviewed from 6/2022 showing normal renal function with cmp, normal blood counts with cbc, normal thyroid function with t4 and tsh. \par \par \par f/u 3 months or sooner prn\par Patient agrees with plan, all further questions answered during encounter.\par \par

## 2022-12-23 ENCOUNTER — RX RENEWAL (OUTPATIENT)
Age: 52
End: 2022-12-23

## 2022-12-23 RX ORDER — SILDENAFIL 100 MG/1
100 TABLET, FILM COATED ORAL
Qty: 20 | Refills: 2 | Status: ACTIVE | COMMUNITY
Start: 2018-02-24 | End: 1900-01-01

## 2023-04-17 ENCOUNTER — RX RENEWAL (OUTPATIENT)
Age: 53
End: 2023-04-17

## 2023-06-10 ENCOUNTER — APPOINTMENT (OUTPATIENT)
Dept: FAMILY MEDICINE | Facility: CLINIC | Age: 53
End: 2023-06-10
Payer: COMMERCIAL

## 2023-06-10 VITALS
WEIGHT: 213 LBS | HEART RATE: 85 BPM | SYSTOLIC BLOOD PRESSURE: 136 MMHG | DIASTOLIC BLOOD PRESSURE: 90 MMHG | TEMPERATURE: 98.5 F | OXYGEN SATURATION: 98 % | RESPIRATION RATE: 14 BRPM | HEIGHT: 72 IN | BODY MASS INDEX: 28.85 KG/M2

## 2023-06-10 DIAGNOSIS — M79.89 OTHER SPECIFIED SOFT TISSUE DISORDERS: ICD-10-CM

## 2023-06-10 PROCEDURE — 99213 OFFICE O/P EST LOW 20 MIN: CPT

## 2023-06-10 RX ORDER — AMLODIPINE BESYLATE 10 MG/1
10 TABLET ORAL
Qty: 90 | Refills: 2 | Status: DISCONTINUED | COMMUNITY
Start: 2018-02-23 | End: 2023-06-10

## 2023-06-10 NOTE — ASSESSMENT
[FreeTextEntry1] : JAIRO THOMPSON is a 53 year male who presents today Vasyl 10, 2023 for HTN and bilateral ankle feet swelling that is now resolved.\par \par HTN\par - B.P 130s/90s\par - Will switch Norvasc 10mg to Losartan 25mg QD due to on & off ankle/feet swelling x 1 year.\par - Patient advised to obtain automatic blood pressure to check blood pressure at home and bring blood pressure log to next appointment \par - F/U in 4 weeks\par

## 2023-06-10 NOTE — HISTORY OF PRESENT ILLNESS
[FreeTextEntry8] : JAIRO THOMPSON is a 53 year male who presents today Vasyl 10, 2023 with symptoms of ankles and feet swelling X 2 weeks .\par \par [place]. At the end of the day he gets bilateral feet and ankle swelling. The swelling is completely resolved now. He came into office Aug 2022 with the same symptoms. At that time, it was discussed this may be a medication side effect  of Norvasc and discussed switching  the medication. Patient would like to switch the medication. \par \par Patient denies any lightheadedness, dizziness, chest pain, palpitations, shortness or breath, or lower leg edema at this time.

## 2023-07-11 ENCOUNTER — APPOINTMENT (OUTPATIENT)
Dept: FAMILY MEDICINE | Facility: CLINIC | Age: 53
End: 2023-07-11

## 2024-03-11 ENCOUNTER — RX RENEWAL (OUTPATIENT)
Age: 54
End: 2024-03-11

## 2024-04-25 RX ORDER — LOSARTAN POTASSIUM 25 MG/1
25 TABLET, FILM COATED ORAL
Qty: 1 | Refills: 0 | Status: ACTIVE | COMMUNITY
Start: 2023-06-10 | End: 1900-01-01

## 2024-04-27 ENCOUNTER — APPOINTMENT (OUTPATIENT)
Dept: FAMILY MEDICINE | Facility: CLINIC | Age: 54
End: 2024-04-27
Payer: COMMERCIAL

## 2024-04-27 VITALS
TEMPERATURE: 98.6 F | HEIGHT: 72 IN | DIASTOLIC BLOOD PRESSURE: 90 MMHG | WEIGHT: 220 LBS | HEART RATE: 80 BPM | BODY MASS INDEX: 29.8 KG/M2 | RESPIRATION RATE: 14 BRPM | SYSTOLIC BLOOD PRESSURE: 142 MMHG | OXYGEN SATURATION: 97 %

## 2024-04-27 DIAGNOSIS — L71.9 ROSACEA, UNSPECIFIED: ICD-10-CM

## 2024-04-27 DIAGNOSIS — Z00.00 ENCOUNTER FOR GENERAL ADULT MEDICAL EXAMINATION W/OUT ABNORMAL FINDINGS: ICD-10-CM

## 2024-04-27 PROCEDURE — 36415 COLL VENOUS BLD VENIPUNCTURE: CPT

## 2024-04-27 PROCEDURE — 99396 PREV VISIT EST AGE 40-64: CPT

## 2024-04-27 RX ORDER — TRIAMCINOLONE ACETONIDE 1 MG/G
0.1 CREAM TOPICAL
Qty: 454 | Refills: 0 | Status: COMPLETED | COMMUNITY
Start: 2022-03-07 | End: 2024-04-27

## 2024-04-27 RX ORDER — DOXYCYCLINE 40 MG/1
40 CAPSULE ORAL DAILY
Qty: 90 | Refills: 0 | Status: ACTIVE | COMMUNITY
Start: 2024-04-27 | End: 1900-01-01

## 2024-04-27 RX ORDER — LORATADINE 10 MG/1
10 TABLET ORAL
Qty: 90 | Refills: 2 | Status: COMPLETED | COMMUNITY
Start: 2022-02-07 | End: 2024-04-27

## 2024-04-27 RX ORDER — DOXYCYCLINE HYCLATE 100 MG/1
100 CAPSULE ORAL
Qty: 30 | Refills: 0 | Status: COMPLETED | COMMUNITY
Start: 2023-07-26

## 2024-04-27 RX ORDER — SILVER SULFADIAZINE 10 MG/G
1 CREAM TOPICAL
Qty: 50 | Refills: 0 | Status: COMPLETED | COMMUNITY
Start: 2022-05-14 | End: 2024-04-27

## 2024-04-27 RX ORDER — BENZONATATE 200 MG/1
200 CAPSULE ORAL
Qty: 20 | Refills: 0 | Status: COMPLETED | COMMUNITY
Start: 2022-06-02 | End: 2024-04-27

## 2024-04-27 RX ORDER — DOXYCYCLINE HYCLATE 20 MG/1
20 TABLET ORAL
Qty: 60 | Refills: 0 | Status: COMPLETED | COMMUNITY
Start: 2023-12-13

## 2024-04-27 RX ORDER — PREDNISONE 20 MG/1
20 TABLET ORAL
Qty: 10 | Refills: 0 | Status: COMPLETED | COMMUNITY
Start: 2022-06-02 | End: 2024-04-27

## 2024-04-27 NOTE — HISTORY OF PRESENT ILLNESS
[FreeTextEntry1] : patient presents for physical  [de-identified] : Presenting in office for CPE has been off of his blood pressure medications for one month  He restarted blood pressure medications 2 days ago

## 2024-04-27 NOTE — HEALTH RISK ASSESSMENT
[Good] : ~his/her~  mood as  good [No] : In the past 12 months have you used drugs other than those required for medical reasons? No [No falls in past year] : Patient reported no falls in the past year [0] : 2) Feeling down, depressed, or hopeless: Not at all (0) [PHQ-2 Negative - No further assessment needed] : PHQ-2 Negative - No further assessment needed [No Retinopathy] : No retinopathy [Hepatitis C test declined] : Hepatitis C test declined [HIV test declined] : HIV test declined [None] : None [With Family] : lives with family [Employed] : employed [College] : College [] :  [# Of Children ___] : has [unfilled] children [Sexually Active] : sexually active [Feels Safe at Home] : Feels safe at home [Fully functional (bathing, dressing, toileting, transferring, walking, feeding)] : Fully functional (bathing, dressing, toileting, transferring, walking, feeding) [Fully functional (using the telephone, shopping, preparing meals, housekeeping, doing laundry, using] : Fully functional and needs no help or supervision to perform IADLs (using the telephone, shopping, preparing meals, housekeeping, doing laundry, using transportation, managing medications and managing finances) [Reports normal functional visual acuity (ie: able to read med bottle)] : Reports normal functional visual acuity [Smoke Detector] : smoke detector [Carbon Monoxide Detector] : carbon monoxide detector [Safety elements used in home] : safety elements used in home [Seat Belt] :  uses seat belt [Sunscreen] : uses sunscreen [Patient/Caregiver not ready to engage] : , patient/caregiver not ready to engage [Never] : Never [FreeTextEntry1] : needs to lose weight  [de-identified] : denies  [de-identified] : ENT, Cardiologist [de-identified] : not much lately other then walk his dog a few miles daily  [de-identified] : breakfast- usually skips. lunch makes a sandwich, dinner is waht it is, will grill steak or hamburger or pasta  [[id number]] : 0 [EyeExamDate] : 2020 [Change in mental status noted] : No change in mental status noted [Language] : denies difficulty with language [Behavior] : denies difficulty with behavior [Learning/Retaining New Information] : denies difficulty learning/retaining new information [Handling Complex Tasks] : denies difficulty handling complex tasks [Reasoning] : denies difficulty with reasoning [Spatial Ability and Orientation] : denies difficulty with spatial ability and orientation [High Risk Behavior] : no high risk behavior [Reports changes in hearing] : Reports no changes in hearing [Reports changes in vision] : Reports no changes in vision [Reports changes in dental health] : Reports no changes in dental health [ColonoscopyComments] : denies  [FreeTextEntry2] : Retired from [employer]  [FreeTextEntry3] : moved out

## 2024-04-27 NOTE — PLAN
[FreeTextEntry1] : htn poorly controlled he is going to obtain blood pressure cuff and check blood pressures daily for the next two weeks continue with losartan daily may need increase dose will follow up in 2 weeks Modifiable, non pharmacological interventions for the prevention and reduction of HTN include Weight loss, Healthy diet with a focus on the DASH diet- rich in fruits, vegetables, whole grains, and low-fat dairy products with reduced content of saturated and total fat, reduced intake of dietary sodium, enhanced intake of dietary potassium with an aim for 3500 to 5000 mg/day preferably by consumption of a diet rich in potassium, physical activity, 90 to 150 minutes/week, and moderation in alcohol intake In individuals who drink alcohol, reduce alcohol to men: =2 drinks daily women: =1 drink daily.  routine labs as above he is going to obtain colonscopy through 9/11 surveillance  recommend having annual eye exam to check for retinopathy

## 2024-04-29 LAB
ALBUMIN SERPL ELPH-MCNC: 4.9 G/DL
ALP BLD-CCNC: 58 U/L
ALT SERPL-CCNC: 32 U/L
ANION GAP SERPL CALC-SCNC: 14 MMOL/L
APPEARANCE: CLEAR
AST SERPL-CCNC: 25 U/L
BASOPHILS # BLD AUTO: 0.07 K/UL
BASOPHILS NFR BLD AUTO: 1 %
BILIRUB SERPL-MCNC: 0.8 MG/DL
BILIRUBIN URINE: NEGATIVE
BLOOD URINE: NEGATIVE
BUN SERPL-MCNC: 12 MG/DL
CALCIUM SERPL-MCNC: 9.6 MG/DL
CHLORIDE SERPL-SCNC: 103 MMOL/L
CHOLEST SERPL-MCNC: 142 MG/DL
CO2 SERPL-SCNC: 25 MMOL/L
COLOR: YELLOW
CREAT SERPL-MCNC: 1.01 MG/DL
CREAT SPEC-SCNC: 196 MG/DL
EGFR: 87 ML/MIN/1.73M2
EOSINOPHIL # BLD AUTO: 0.37 K/UL
EOSINOPHIL NFR BLD AUTO: 5.3 %
FOLATE SERPL-MCNC: 12.7 NG/ML
GLUCOSE QUALITATIVE U: NEGATIVE MG/DL
GLUCOSE SERPL-MCNC: 99 MG/DL
HCT VFR BLD CALC: 52.4 %
HDLC SERPL-MCNC: 46 MG/DL
HGB BLD-MCNC: 17 G/DL
IMM GRANULOCYTES NFR BLD AUTO: 0.1 %
KETONES URINE: NEGATIVE MG/DL
LDLC SERPL CALC-MCNC: 69 MG/DL
LEUKOCYTE ESTERASE URINE: NEGATIVE
LYMPHOCYTES # BLD AUTO: 1.68 K/UL
LYMPHOCYTES NFR BLD AUTO: 23.9 %
MAN DIFF?: NORMAL
MCHC RBC-ENTMCNC: 28.5 PG
MCHC RBC-ENTMCNC: 32.4 GM/DL
MCV RBC AUTO: 87.9 FL
MICROALBUMIN 24H UR DL<=1MG/L-MCNC: 1.8 MG/DL
MICROALBUMIN/CREAT 24H UR-RTO: 9 MG/G
MONOCYTES # BLD AUTO: 0.68 K/UL
MONOCYTES NFR BLD AUTO: 9.7 %
NEUTROPHILS # BLD AUTO: 4.22 K/UL
NEUTROPHILS NFR BLD AUTO: 60 %
NITRITE URINE: NEGATIVE
NONHDLC SERPL-MCNC: 96 MG/DL
PH URINE: 6.5
PLATELET # BLD AUTO: 286 K/UL
POTASSIUM SERPL-SCNC: 4.8 MMOL/L
PROT SERPL-MCNC: 7.4 G/DL
PROTEIN URINE: NEGATIVE MG/DL
PSA SERPL-MCNC: 1.13 NG/ML
RBC # BLD: 5.96 M/UL
RBC # FLD: 13 %
SODIUM SERPL-SCNC: 142 MMOL/L
SPECIFIC GRAVITY URINE: 1.02
T3FREE SERPL-MCNC: 3.7 PG/ML
T4 FREE SERPL-MCNC: 1.1 NG/DL
TRIGL SERPL-MCNC: 156 MG/DL
TSH SERPL-ACNC: 0.76 UIU/ML
UROBILINOGEN URINE: 0.2 MG/DL
VIT B12 SERPL-MCNC: 589 PG/ML
WBC # FLD AUTO: 7.03 K/UL

## 2024-04-30 ENCOUNTER — APPOINTMENT (OUTPATIENT)
Dept: FAMILY MEDICINE | Facility: CLINIC | Age: 54
End: 2024-04-30

## 2024-05-21 ENCOUNTER — APPOINTMENT (OUTPATIENT)
Dept: FAMILY MEDICINE | Facility: CLINIC | Age: 54
End: 2024-05-21
Payer: COMMERCIAL

## 2024-05-21 VITALS
HEIGHT: 72 IN | HEART RATE: 100 BPM | OXYGEN SATURATION: 98 % | TEMPERATURE: 98.4 F | DIASTOLIC BLOOD PRESSURE: 90 MMHG | BODY MASS INDEX: 29.8 KG/M2 | WEIGHT: 220 LBS | SYSTOLIC BLOOD PRESSURE: 152 MMHG | RESPIRATION RATE: 14 BRPM

## 2024-05-21 DIAGNOSIS — I10 ESSENTIAL (PRIMARY) HYPERTENSION: ICD-10-CM

## 2024-05-21 PROCEDURE — 99213 OFFICE O/P EST LOW 20 MIN: CPT

## 2024-05-21 PROCEDURE — G2211 COMPLEX E/M VISIT ADD ON: CPT

## 2024-05-21 RX ORDER — LOSARTAN POTASSIUM 50 MG/1
50 TABLET, FILM COATED ORAL DAILY
Qty: 90 | Refills: 0 | Status: ACTIVE | COMMUNITY
Start: 2024-05-21 | End: 1900-01-01

## 2024-05-21 NOTE — PLAN
[FreeTextEntry1] : Patient presents for elevated BP.  Has a cuff from [employer] which is reading high at home.  BP in office of 152/90.  He was also checked in the office with his home cuff, which is reading 150's-180's/110-120's.  Has a mild headache, but no other symptoms which would indicate IV HTN medications.  Advised him to shop for a cuff which is larger and to take his BP 4-6 hours after taking his medications.  For his BP medication, I will increase his Losartan to 25mg to 50mg qd due to elevated BP reading on our office cuff.  Advised to keep a log at home for 2-4 weeks.  Advised to go to the ED if he develops chest pain, SOB, vision issues, urinary retention or gait issues.

## 2024-05-21 NOTE — HISTORY OF PRESENT ILLNESS
[FreeTextEntry1] : patient presents for b/p check  [de-identified] : Patient here for BP check.  BP in office of 152/90.  Home BP readings have been significant higher.  Only has mild headaches.  Denies Chest pain, SOB, urinary retention or vision issues.  Was on Amlodipine in the past but stopped due to leg swelling.  Has been on Losartan 25mg qd for 3 weeks but BP's still elevated.

## 2024-05-21 NOTE — PHYSICAL EXAM
[No Acute Distress] : no acute distress [Well Nourished] : well nourished [Well Developed] : well developed [Well-Appearing] : well-appearing [Normal Sclera/Conjunctiva] : normal sclera/conjunctiva [Normal Outer Ear/Nose] : the outer ears and nose were normal in appearance [No JVD] : no jugular venous distention [No Respiratory Distress] : no respiratory distress  [Normal Gait] : normal gait [Normal Affect] : the affect was normal [Normal Insight/Judgement] : insight and judgment were intact

## 2024-08-05 ENCOUNTER — RX RENEWAL (OUTPATIENT)
Age: 54
End: 2024-08-05

## 2024-09-30 ENCOUNTER — RX RENEWAL (OUTPATIENT)
Age: 54
End: 2024-09-30

## 2024-11-02 ENCOUNTER — NON-APPOINTMENT (OUTPATIENT)
Age: 54
End: 2024-11-02

## 2024-12-16 ENCOUNTER — APPOINTMENT (OUTPATIENT)
Dept: FAMILY MEDICINE | Facility: CLINIC | Age: 54
End: 2024-12-16

## 2024-12-16 VITALS
HEIGHT: 72 IN | SYSTOLIC BLOOD PRESSURE: 140 MMHG | OXYGEN SATURATION: 97 % | RESPIRATION RATE: 14 BRPM | BODY MASS INDEX: 30.07 KG/M2 | DIASTOLIC BLOOD PRESSURE: 90 MMHG | HEART RATE: 83 BPM | WEIGHT: 222 LBS | TEMPERATURE: 97.6 F

## 2024-12-16 DIAGNOSIS — I10 ESSENTIAL (PRIMARY) HYPERTENSION: ICD-10-CM

## 2024-12-16 PROCEDURE — 90656 IIV3 VACC NO PRSV 0.5 ML IM: CPT

## 2024-12-16 PROCEDURE — 99213 OFFICE O/P EST LOW 20 MIN: CPT | Mod: 25

## 2024-12-16 PROCEDURE — G0008: CPT

## 2024-12-28 ENCOUNTER — NON-APPOINTMENT (OUTPATIENT)
Age: 54
End: 2024-12-28

## 2025-01-18 ENCOUNTER — APPOINTMENT (OUTPATIENT)
Dept: FAMILY MEDICINE | Facility: CLINIC | Age: 55
End: 2025-01-18

## 2025-01-18 VITALS
DIASTOLIC BLOOD PRESSURE: 104 MMHG | TEMPERATURE: 97.7 F | HEIGHT: 72 IN | RESPIRATION RATE: 14 BRPM | HEART RATE: 86 BPM | WEIGHT: 220 LBS | OXYGEN SATURATION: 99 % | BODY MASS INDEX: 29.8 KG/M2 | SYSTOLIC BLOOD PRESSURE: 148 MMHG

## 2025-01-18 DIAGNOSIS — I10 ESSENTIAL (PRIMARY) HYPERTENSION: ICD-10-CM

## 2025-01-18 PROCEDURE — G2211 COMPLEX E/M VISIT ADD ON: CPT | Mod: NC

## 2025-01-18 PROCEDURE — 99213 OFFICE O/P EST LOW 20 MIN: CPT

## 2025-02-03 RX ORDER — LOSARTAN POTASSIUM 25 MG/1
25 TABLET, FILM COATED ORAL DAILY
Qty: 30 | Refills: 0 | Status: ACTIVE | COMMUNITY
Start: 2025-02-03 | End: 1900-01-01